# Patient Record
Sex: FEMALE | Race: WHITE | NOT HISPANIC OR LATINO | Employment: FULL TIME | URBAN - METROPOLITAN AREA
[De-identification: names, ages, dates, MRNs, and addresses within clinical notes are randomized per-mention and may not be internally consistent; named-entity substitution may affect disease eponyms.]

---

## 2018-06-22 ENCOUNTER — OFFICE VISIT (OUTPATIENT)
Dept: URGENT CARE | Facility: CLINIC | Age: 33
End: 2018-06-22
Payer: COMMERCIAL

## 2018-06-22 VITALS
RESPIRATION RATE: 12 BRPM | HEART RATE: 66 BPM | DIASTOLIC BLOOD PRESSURE: 78 MMHG | TEMPERATURE: 99.9 F | SYSTOLIC BLOOD PRESSURE: 120 MMHG | HEIGHT: 67 IN | WEIGHT: 133.6 LBS | OXYGEN SATURATION: 97 % | BODY MASS INDEX: 20.97 KG/M2

## 2018-06-22 DIAGNOSIS — J06.9 UPPER RESPIRATORY TRACT INFECTION, UNSPECIFIED TYPE: ICD-10-CM

## 2018-06-22 PROCEDURE — 99204 OFFICE O/P NEW MOD 45 MIN: CPT | Performed by: PHYSICIAN ASSISTANT

## 2018-06-22 RX ORDER — BENZONATATE 200 MG/1
200 CAPSULE ORAL 3 TIMES DAILY PRN
Qty: 30 CAP | Refills: 0 | Status: SHIPPED | OUTPATIENT
Start: 2018-06-22 | End: 2019-05-31

## 2018-06-22 RX ORDER — CODEINE PHOSPHATE AND GUAIFENESIN 10; 100 MG/5ML; MG/5ML
5 SOLUTION ORAL EVERY 6 HOURS PRN
Qty: 90 ML | Refills: 0 | Status: SHIPPED | OUTPATIENT
Start: 2018-06-22 | End: 2018-06-29

## 2018-06-22 ASSESSMENT — ENCOUNTER SYMPTOMS
MYALGIAS: 1
TINGLING: 0
SORE THROAT: 0
SENSORY CHANGE: 0
CHILLS: 0
VOMITING: 0
ABDOMINAL PAIN: 0
COUGH: 1
DIARRHEA: 0
SHORTNESS OF BREATH: 0
NAUSEA: 0
SPUTUM PRODUCTION: 0
PALPITATIONS: 0
HEADACHES: 0
FOCAL WEAKNESS: 0
RHINORRHEA: 0
SINUS PAIN: 0
HEMOPTYSIS: 0
WHEEZING: 0
FEVER: 0

## 2018-06-22 ASSESSMENT — PATIENT HEALTH QUESTIONNAIRE - PHQ9: CLINICAL INTERPRETATION OF PHQ2 SCORE: 0

## 2018-06-22 ASSESSMENT — COPD QUESTIONNAIRES: COPD: 0

## 2018-06-22 NOTE — PROGRESS NOTES
Subjective:      Priscilla Salvador is a 33 y.o. female who presents with Cough (has been fighting a cold, cough worsens at night, R side soreness under armpit )            Cough   This is a new problem. Episode onset: 1 week  The problem has been gradually improving. The cough is non-productive. Associated symptoms include chest pain (right rib area ) and myalgias. Pertinent negatives include no chills, ear congestion, ear pain, fever, headaches, hemoptysis, nasal congestion, postnasal drip, rash, rhinorrhea, sore throat, shortness of breath or wheezing. The symptoms are aggravated by lying down. She has tried OTC cough suppressant for the symptoms. The treatment provided no relief. There is no history of asthma, bronchitis, COPD or pneumonia.     No past medical history on file.    Past Surgical History:   Procedure Laterality Date   • BREAST IMPLANT REVISION Bilateral 1/7/2016    Procedure: BREAST IMPLANT - EXCHANGE;  Surgeon: Bg Hopkins Jr., M.D.;  Location: [facility];  Service:    • BREAST IMPLANT REVISION  3/8/2012    Performed by BG HOPKINS JR at [facility]       Family History   Problem Relation Age of Onset   • Family history unknown: Yes       No Known Allergies    Medications, Allergies, and current problem list reviewed today in Epic      Review of Systems   Constitutional: Negative for chills, fever and malaise/fatigue.   HENT: Negative for congestion, ear pain, postnasal drip, rhinorrhea, sinus pain and sore throat.    Respiratory: Positive for cough. Negative for hemoptysis, sputum production, shortness of breath and wheezing.    Cardiovascular: Positive for chest pain (right rib area ). Negative for palpitations and leg swelling.   Gastrointestinal: Negative for abdominal pain, diarrhea, nausea and vomiting.   Musculoskeletal: Positive for myalgias.   Skin: Negative for rash.   Neurological: Negative for tingling, sensory change, focal weakness and  "headaches.     All other systems reviewed and are negative.        Objective:     /78   Pulse 66   Temp 37.7 °C (99.9 °F)   Resp 12   Ht 1.689 m (5' 6.5\")   Wt 60.6 kg (133 lb 9.6 oz)   SpO2 97%   Breastfeeding? No   BMI 21.24 kg/m²      Physical Exam   Constitutional: She is oriented to person, place, and time. She appears well-developed and well-nourished. No distress.   HENT:   Head: Normocephalic and atraumatic.   Right Ear: Tympanic membrane, external ear and ear canal normal.   Left Ear: Tympanic membrane, external ear and ear canal normal.   Nose: Nose normal.   Mouth/Throat: Uvula is midline, oropharynx is clear and moist and mucous membranes are normal.   Cardiovascular: Normal rate and regular rhythm.  Exam reveals no gallop and no friction rub.    No murmur heard.  Pulmonary/Chest: Effort normal and breath sounds normal. No respiratory distress. She has no decreased breath sounds. She has no wheezes. She has no rhonchi. She has no rales.       Neurological: She is alert and oriented to person, place, and time. No cranial nerve deficit.   Psychiatric: She has a normal mood and affect. Her behavior is normal. Judgment and thought content normal.               Assessment/Plan:     1. Upper respiratory tract infection, unspecified type  guaifenesin-codeine (CHERATUSSIN AC) Solution oral solution    benzonatate (TESSALON) 200 MG capsule         Current Outpatient Prescriptions:   •  guaifenesin-codeine (CHERATUSSIN AC) Solution oral solution, Take 5 mL by mouth every 6 hours as needed for up to 7 days., Disp: 90 mL, Rfl: 0  Sedation side effects discussed. No Driving or alcohol with medication given.    [facility] Aware web site evaluation: I have obtained and reviewed patient utilization report from [facility] pharmacy database prior to writing prescription for controlled substance II, III or IV per [place] bill . Based on the report and my clinical assessment the prescription is medically " necessary.    •  benzonatate (TESSALON) 200 MG capsule, Take 1 Cap by mouth 3 times a day as needed for Cough., Disp: 30 Cap, Rfl: 0       Differential diagnoses, Supportive care, and indications for immediate follow-up discussed with patient.   Instructed to return to clinic or nearest emergency department for any change in condition, further concerns, or worsening of symptoms.    The patient demonstrated a good understanding and agreed with the treatment plan.    Annel Eastman P.A.-C.

## 2019-04-15 ENCOUNTER — TELEPHONE (OUTPATIENT)
Dept: SCHEDULING | Facility: IMAGING CENTER | Age: 34
End: 2019-04-15

## 2019-05-22 ENCOUNTER — PATIENT MESSAGE (OUTPATIENT)
Dept: INTERNAL MEDICINE | Facility: MEDICAL CENTER | Age: 34
End: 2019-05-22

## 2019-05-23 ENCOUNTER — TELEPHONE (OUTPATIENT)
Dept: SCHEDULING | Facility: IMAGING CENTER | Age: 34
End: 2019-05-23

## 2019-05-31 ENCOUNTER — OFFICE VISIT (OUTPATIENT)
Dept: MEDICAL GROUP | Facility: MEDICAL CENTER | Age: 34
End: 2019-05-31
Payer: COMMERCIAL

## 2019-05-31 VITALS
WEIGHT: 134.92 LBS | HEIGHT: 67 IN | DIASTOLIC BLOOD PRESSURE: 72 MMHG | HEART RATE: 57 BPM | BODY MASS INDEX: 21.18 KG/M2 | TEMPERATURE: 98.6 F | SYSTOLIC BLOOD PRESSURE: 98 MMHG | OXYGEN SATURATION: 100 %

## 2019-05-31 DIAGNOSIS — Z00.00 HEALTH CARE MAINTENANCE: ICD-10-CM

## 2019-05-31 DIAGNOSIS — M25.561 CHRONIC PAIN OF RIGHT KNEE: ICD-10-CM

## 2019-05-31 DIAGNOSIS — Z30.41 ORAL CONTRACEPTIVE USE: ICD-10-CM

## 2019-05-31 DIAGNOSIS — G89.29 CHRONIC PAIN OF RIGHT KNEE: ICD-10-CM

## 2019-05-31 DIAGNOSIS — Z76.89 ENCOUNTER TO ESTABLISH CARE: ICD-10-CM
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
## 2019-05-31 PROCEDURE — 99214 OFFICE O/P EST MOD 30 MIN: CPT | Performed by: INTERNAL MEDICINE

## 2019-05-31 ASSESSMENT — PATIENT HEALTH QUESTIONNAIRE - PHQ9: CLINICAL INTERPRETATION OF PHQ2 SCORE: 0

## 2019-05-31 NOTE — PROGRESS NOTES
CHIEF COMPLAINT  Chief Complaint   Patient presents with   • Establish Care   • Knee Pain     (R) Knee injury x 10 years ago / Popping knee       HPI  Patient is a 34 y.o. female patient who presents today for the following     Right knee pain, overuse injury  - Onset: ~ 10 yrs  - Triger: lifting, running  - located in: RT knee  - intensity: mild  - quality:  ache  - radiation:  no  - alleviating factors are:  rest  -  exacerbating factors are:  activity  - accompanied:    - popping, crackling   - no numbness, weakness, tingling, fever, chills  - course: up/down  - imaging: pending  - treatment: as above    OCP  Started 4 yrs ago  On: Norethindrone Acet-Ethinyl Est (Microgestin) 1/20 PO  No:   Uncontrolled migraine  Hypertension  Past medical history or family history of VTE  Smoking/tobacco use.    Reviewed PMH, PSH, FH, SH, ALL, HCM/IMM  Reviewed MEDS    Patient Active Problem List    Diagnosis Date Noted   • Oral contraceptive use 05/31/2019   • Health care maintenance 05/31/2019   • Chronic pain of right knee 05/31/2019     CURRENT MEDICATIONS  Current Outpatient Prescriptions   Medication Sig Dispense Refill   • Omega-3 Fatty Acids (FISH OIL) 1000 MG Cap capsule Take 1,000 mg by mouth 3 times a day, with meals.     • multivitamin (THERAGRAN) Tab Take 1 Tab by mouth every day.     • vitamin D (CHOLECALCIFEROL) 1000 UNIT Tab Take 1,000 Units by mouth every day.     • ascorbic acid (ASCORBIC ACID) 500 MG Tab Take 500 mg by mouth every day.     • Biotin 10 MG Tab Take 1 Tab by mouth every day.       No current facility-administered medications for this visit.      ALLERGIES  Allergies: Patient has no known allergies.  PAST MEDICAL HISTORY  No past medical history on file.  SURGICAL HISTORY  She  has a past surgical history that includes breast implant revision (3/8/2012) and breast implant revision (Bilateral, 1/7/2016).  SOCIAL HISTORY  Social History   Substance Use Topics   • Smoking status: Never Smoker   •  "Smokeless tobacco: Never Used   • Alcohol use No     Social History     Social History Narrative   • No narrative on file     FAMILY HISTORY  Family History   Problem Relation Age of Onset   • Hypertension Mother    • Hyperlipidemia Father    • No Known Problems Sister    • No Known Problems Brother      Family Status   Relation Status   • Mo    • Fa    • Sis Alive   • Bro Alive     ROS   Constitutional: Negative for fever, chills.  HENT: Negative for congestion, sore throat.  Eyes: Negative for blurred vision.   Respiratory: Negative for cough, shortness of breath.  Cardiovascular: Negative for chest pain, palpitations.   Gastrointestinal: Negative for heartburn, nausea, abdominal pain.   Genitourinary: As above.  Musculoskeletal: as above.  Skin: Negative for rash and itching.   Neuro: Negative for dizziness, weakness and headaches.   Endo/Heme/Allergies: Does not bruise/bleed easily.   Psychiatric/Behavioral: Negative for depression.    PHYSICAL EXAM   BP (!) 98/72 (BP Location: Right arm, Patient Position: Sitting, BP Cuff Size: Adult)   Pulse (!) 57   Temp 37 °C (98.6 °F) (Temporal)   Ht 1.689 m (5' 6.5\")   Wt 61.2 kg (134 lb 14.7 oz)   SpO2 100%  Body mass index is 21.45 kg/m².  General:  NAD, well appearing  HEENT:   NC/AT, PERRLA, EOMI, TMs are clear. Oropharyngeal mucosa is pink,  without lesions;  no cervical / supraclavicular  lymphadenopathy, no thyromegaly.    Cardiovascular: RRR.   No m/r/g. No carotid bruits .      Lungs:   CTAB, no w/r/r, no respiratory distress.  Abdomen: Soft, NT/ND + BS; no suprapubic tenderness; no masses or hepatosplenomegaly.  Extremities:  2+ DP and radial pulses bilaterally.  No c/c/e.   Skin:  Warm, dry.  No erythema. No rash.   Neurologic: Alert & oriented x 3. CN II-XII grossly intact. Brachioradialis / knee DTR are 2/4, symmetric. Strength and sensation grossly intact.  No focal deficits.  Psychiatric:  Affect normal, mood normal, judgment " normal.  MS: no effusion, redness, swelling of the RT knee.    LABS     None    IMAGING     None    ASSESMENT AND PLAN        1. Chronic pain of right knee  Advised to decrease physical activity/running.  Knee brace may help.  Referred to sports medicine, as she is involved in sports/exercise.  - DX-KNEE 2- RIGHT; Future  - REFERRAL TO SPORTS MEDICINE    2. Overuse injury  As above  - DX-KNEE 2- RIGHT; Future  - REFERRAL TO SPORTS MEDICINE    3. Oral contraceptive use  No adverse effects, continue current treatment and GYN follow-up    4. Health care maintenance  5. Encounter to establish care  Reviewed PMH, PSH, FH, SH, ALL, MEDS, HCM/IMM.   Release form for old records: signed    Counseling:   - Smoking:  Nonsmoker    Followup: Return in about 6 months (around 11/30/2019), or if symptoms worsen or fail to improve.    All questions are answered.    Please note that this dictation was created using voice recognition software, and that there might be errors of mary and possibly content.

## 2019-09-03 ENCOUNTER — OFFICE VISIT (OUTPATIENT)
Dept: MEDICAL GROUP | Facility: MEDICAL CENTER | Age: 34
End: 2019-09-03
Payer: COMMERCIAL

## 2019-09-03 ENCOUNTER — HOSPITAL ENCOUNTER (OUTPATIENT)
Facility: MEDICAL CENTER | Age: 34
End: 2019-09-03
Attending: INTERNAL MEDICINE
Payer: COMMERCIAL

## 2019-09-03 VITALS
SYSTOLIC BLOOD PRESSURE: 96 MMHG | BODY MASS INDEX: 21 KG/M2 | TEMPERATURE: 97.3 F | HEIGHT: 67 IN | WEIGHT: 133.82 LBS | OXYGEN SATURATION: 99 % | HEART RATE: 58 BPM | DIASTOLIC BLOOD PRESSURE: 70 MMHG

## 2019-09-03 DIAGNOSIS — M25.561 CHRONIC PAIN OF RIGHT KNEE: ICD-10-CM

## 2019-09-03 DIAGNOSIS — Z00.00 HEALTH CARE MAINTENANCE: ICD-10-CM

## 2019-09-03 DIAGNOSIS — Z01.419 WELL FEMALE EXAM WITH ROUTINE GYNECOLOGICAL EXAM: ICD-10-CM

## 2019-09-03 DIAGNOSIS — Z12.4 SCREENING FOR MALIGNANT NEOPLASM OF CERVIX: ICD-10-CM

## 2019-09-03 DIAGNOSIS — G89.29 CHRONIC PAIN OF RIGHT KNEE: ICD-10-CM

## 2019-09-03 DIAGNOSIS — Z23 NEED FOR TDAP VACCINATION: ICD-10-CM

## 2019-09-03 DIAGNOSIS — Z30.41 ORAL CONTRACEPTIVE USE: ICD-10-CM

## 2019-09-03 PROCEDURE — 87624 HPV HI-RISK TYP POOLED RSLT: CPT

## 2019-09-03 PROCEDURE — 88175 CYTOPATH C/V AUTO FLUID REDO: CPT

## 2019-09-03 PROCEDURE — 99395 PREV VISIT EST AGE 18-39: CPT | Performed by: INTERNAL MEDICINE

## 2019-09-03 NOTE — PROGRESS NOTES
CHIEF COMPLIANT:  Priscilla Salvador is a 34 y.o. female who presents for annual exam    Recommendations:  Regular exercise at least 4 days a week  Diet: advised balanced diet  Dental exam at least 1-2 times per year  Sunscreen use: advised    Immunizations:  TdaP:  9/3/2019  Influenza: unavailable    GYN  Previous PAP:  normal   Abnormal PAP: no    Menses every month with bleeding for 4 days  No excess cramping or bleeding.   Takes OTC analgesics for cramping  Contraception: Norethindrone Acet-Ethinyl Est (Microgestin)  PO    CURRENT MEDICATIONS  Current Outpatient Medications   Medication Sig Dispense Refill   • Omega-3 Fatty Acids (FISH OIL) 1000 MG Cap capsule Take 1,000 mg by mouth 3 times a day, with meals.     • multivitamin (THERAGRAN) Tab Take 1 Tab by mouth every day.     • Biotin 10 MG Tab Take 1 Tab by mouth every day.     • vitamin D (CHOLECALCIFEROL) 1000 UNIT Tab Take 1,000 Units by mouth every day.     • ascorbic acid (ASCORBIC ACID) 500 MG Tab Take 500 mg by mouth every day.       No current facility-administered medications for this visit.      ALLERGIES  Allergies: Patient has no known allergies.  PAST MEDICAL HISTORY  She  has no past medical history on file.  SURGICAL HISTORY  She  has a past surgical history that includes breast implant revision (3/8/2012) and breast implant revision (Bilateral, 2016).  SOCIAL HISTORY  Social History     Tobacco Use   • Smoking status: Never Smoker   • Smokeless tobacco: Never Used   Substance Use Topics   • Alcohol use: No   • Drug use: No     Social History     Social History Narrative   • Not on file     FAMILY HISTORY  Family History   Problem Relation Age of Onset   • Hypertension Mother    • Hyperlipidemia Father    • No Known Problems Sister    • No Known Problems Brother      Family Status   Relation Name Status   • Mo     • Fa     • Sis  Alive   • Bro  Alive     REVIEW OF SYSTEMS  Constitutional: Denies fever, chills,  "fatigue.  Skin: negative for rash, scaling, itching.  Eyes: negative for blurred vision, discharge from eyes.  ENT: negative for hearing problems.  Respiratory: negative for cough, SOB.  Cardiovascular: negative for palpitations, tachycardia, irregular heart beats, chest pain, or peripheral edema.  Gastrointestinal: negative for anorexia, dysphagia, nausea, heartburn/reflux, abdominal pain, hemorrhoids, constipation or diarrhea.  Genitourinary: negative for dysuria,  abnormal vaginal discharge/bleeding.  Musculoskeletal: negative for back/joint pain, myalgia.   Neuro: negative for migraine headaches, involuntary movements or tremor  Psychiatric: negative for excessive alcohol use, illegal drug use, sleep problems, anxiety, depression.  Hematologic/Lymphatic/Immunologic: negative for anemia, unusual bruising, swollen glands.  Endocrine: negative for temperature intolerance, polydipsia, polyuria.     PHYSICAL EXAMINATION:  Blood Pressure (Abnormal) 96/70   Pulse (Abnormal) 58   Temperature 36.3 °C (97.3 °F) (Temporal)   Height 1.689 m (5' 6.5\")   Weight 60.7 kg (133 lb 13.1 oz)   Oxygen Saturation 99%   Body mass index is 21.28 kg/m².  Wt Readings from Last 4 Encounters:   09/03/19 60.7 kg (133 lb 13.1 oz)   05/31/19 61.2 kg (134 lb 14.7 oz)   06/22/18 60.6 kg (133 lb 9.6 oz)   01/07/16 61 kg (134 lb 7.7 oz)     General appearance:healthy, well developed, well nourished, well-groomed  Psych: alert, no distress, cooperative. Eye contact good, speech goal directed. Affect calm.   Eyes: conjunctivae and sclerae normal, lids and lashes normal, EOMI, PERRLA  ENT: Ears: external ears normal to inspection, canals clear. TMs pearly gray with normal light reflex and anatomic landmarks, Nose/Sinuses: nose shows no deformity, asymmetry, or inflammation, nasal mucosa normal, no sinus tenderness,   Oropharynx: lips normal without lesions, buccal mucosa normal, gums healthy, teeth intact, non-carious, palate normal, tongue " midline and normal  Neck: no asymmetry, masses, adenopathy. Thyroid normal to palpation, carotids without bruits, no jugular venous distention  Lungs: clear to auscultation with good excursion, Normal respiratory rate. Chest symmetric no chest wall tenderness.  Cardiovascular: Regular rate and rhythm, no murmur.  No peripheral edema  Abdomen:  Soft, non-tender, no masses, HSM or palpable renal abnormalities. Bowel sounds normal, no bruits heard. No CVAT.  Musculoskeletal: no evidence of joint effusion, ROM of all joints is normal, no crepitation detected, strength grossly normal UE and LE, proximal and distal motor groups. No deformities present  Lymphatic: None significantly enlarged supraclavicular, axillary, or inguinal  Skin: color normal, vascularity normal, no rashes or suspicious lesions, no evidence of bleeding or bruising  Neuro: speech normal, mental status intact, gait grossly normal, muscle tone normal.  GYN: No suprapubic tenderness.  Perineum and external genitalia normal without rash.   Vagina with without discharge, normal mucosa.  Cervix w/o visible lesions or discharge. No CMT  Uterus normal size, non-tender, no masses,   Adnexa w/o mass or tenderness.   PAP smear was obtained.    LABS    None    ASSESSMENT/PLAN:    1. Well female exam with routine gynecological exam  - THINPREP PAP WITH HPV; Future  2. Screening for malignant neoplasm of cervix  - THINPREP PAP WITH HPV; Future    3. Health care maintenance  - THINPREP PAP WITH HPV; Future    4. Oral contraceptive use  Continue current treatment    5. Chronic pain of right knee  Currently stable    6. Need for Tdap vaccination  Unavailable in the office today    Smoking Counseling: Nonsmoker    Next office visit is due in  1 year and prn    All questions are answered.     Please note that this dictation was created using voice recognition software. Despite all efforts, some minor grammar mistakes are possible.

## 2019-09-04 LAB
CYTOLOGY REG CYTOL: NORMAL
HPV HR 12 DNA CVX QL NAA+PROBE: NEGATIVE
HPV16 DNA SPEC QL NAA+PROBE: NEGATIVE
HPV18 DNA SPEC QL NAA+PROBE: NEGATIVE
SPECIMEN SOURCE: NORMAL

## 2019-09-12 ENCOUNTER — OFFICE VISIT (OUTPATIENT)
Dept: MEDICAL GROUP | Facility: MEDICAL CENTER | Age: 34
End: 2019-09-12
Payer: COMMERCIAL

## 2019-09-12 VITALS
HEART RATE: 59 BPM | WEIGHT: 132.5 LBS | TEMPERATURE: 98.7 F | HEIGHT: 67 IN | BODY MASS INDEX: 20.8 KG/M2 | OXYGEN SATURATION: 98 % | DIASTOLIC BLOOD PRESSURE: 56 MMHG | SYSTOLIC BLOOD PRESSURE: 90 MMHG
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
## 2019-09-12 DIAGNOSIS — Z30.41 ORAL CONTRACEPTIVE USE: ICD-10-CM

## 2019-09-12 DIAGNOSIS — Z30.09 FAMILY PLANNING: ICD-10-CM

## 2019-09-12 DIAGNOSIS — G89.29 CHRONIC PAIN OF RIGHT KNEE: ICD-10-CM

## 2019-09-12 DIAGNOSIS — M25.561 CHRONIC PAIN OF RIGHT KNEE: ICD-10-CM

## 2019-09-12 DIAGNOSIS — Z00.00 HEALTH CARE MAINTENANCE: ICD-10-CM

## 2019-09-12 DIAGNOSIS — Z23 NEED FOR TDAP VACCINATION: ICD-10-CM

## 2019-09-12 PROCEDURE — 99213 OFFICE O/P EST LOW 20 MIN: CPT | Mod: 25 | Performed by: INTERNAL MEDICINE

## 2019-09-12 PROCEDURE — 90471 IMMUNIZATION ADMIN: CPT | Performed by: INTERNAL MEDICINE

## 2019-09-12 PROCEDURE — 90715 TDAP VACCINE 7 YRS/> IM: CPT | Performed by: INTERNAL MEDICINE

## 2019-09-12 RX ORDER — NORETHINDRONE ACETATE AND ETHINYL ESTRADIOL 1.5-30(21)
1 KIT ORAL DAILY
Qty: 84 TAB | Refills: 1 | Status: SHIPPED | OUTPATIENT
Start: 2019-09-12 | End: 2020-02-14 | Stop reason: SDUPTHER

## 2019-09-12 NOTE — PROGRESS NOTES
CHIEF COMPLAINT  Chief Complaint   Patient presents with   • Other     birth control, knee pain   • Immunizations     tdap needed?     HPI  Patient is a 34 y.o. female patient who presents today for the following     Family-planning OCP  The patient would like to get pregnant in a few months, as she is getting   -She is interested when to stop OCP, came for refill    Started 4 yrs ago  On: Norethindrone Acet-Ethinyl Est (Microgestin) 1/20 PO  No:   Uncontrolled migraine  Hypertension  Past medical history or family history of VTE  Smoking/tobacco use.    Right knee pain, overuse injury  - seen by sports medicine  - decreased activity  - improved    - Onset: ~ 10 yrs  - Triger: lifting, running  - located in: RT knee  - intensity: mild  - quality: ache  - radiation: no  - alleviating factors are: rest  - exacerbating factors are: activity  - accompanied:               - popping, crackling              - no numbness, weakness, tingling, fever, chills  - course: up/down  - imaging: pending  - treatment: as above           Reviewed PMH, PSH, FH, SH, ALL, HCM/IMM, no changes  Reviewed MEDS, no changes    Patient Active Problem List    Diagnosis Date Noted   • Oral contraceptive use 05/31/2019   • Health care maintenance 05/31/2019   • Chronic pain of right knee 05/31/2019     CURRENT MEDICATIONS  Current Outpatient Medications   Medication Sig Dispense Refill   • norethindrone-ethinyl estradiol-iron (MICROGESTIN FE1.5/30) 1.5-30 MG-MCG tablet Take 1 Tab by mouth every day. 84 Tab 1   • Omega-3 Fatty Acids (FISH OIL) 1000 MG Cap capsule Take 1,000 mg by mouth 3 times a day, with meals.     • multivitamin (THERAGRAN) Tab Take 1 Tab by mouth every day.     • Biotin 10 MG Tab Take 1 Tab by mouth every day.     • vitamin D (CHOLECALCIFEROL) 1000 UNIT Tab Take 1,000 Units by mouth every day.     • ascorbic acid (ASCORBIC ACID) 500 MG Tab Take 500 mg by mouth every day.       No current facility-administered medications  "for this visit.      ALLERGIES  Allergies: Patient has no known allergies.  PAST MEDICAL HISTORY  No past medical history on file.  SURGICAL HISTORY  She  has a past surgical history that includes breast implant revision (3/8/2012) and breast implant revision (Bilateral, 2016).  SOCIAL HISTORY  Social History     Tobacco Use   • Smoking status: Never Smoker   • Smokeless tobacco: Never Used   Substance Use Topics   • Alcohol use: No   • Drug use: No     Social History     Social History Narrative   • Not on file     FAMILY HISTORY  Family History   Problem Relation Age of Onset   • Hypertension Mother    • Hyperlipidemia Father    • No Known Problems Sister    • No Known Problems Brother      Family Status   Relation Name Status   • Mo     • Fa     • Sis  Alive   • Bro  Alive     ROS   Constitutional: Negative for fever, chills.  HENT: Negative for congestion, sore throat.  Eyes: Negative for blurred vision.   Respiratory: Negative for cough, shortness of breath.  Cardiovascular: Negative for chest pain, palpitations.   Gastrointestinal: Negative for heartburn, nausea, abdominal pain.   Genitourinary: Negative for dysuria.  Musculoskeletal: as above.  Skin: Negative for rash and itching.   Neuro: Negative for dizziness, weakness and headaches.   Endo/Heme/Allergies: Does not bruise/bleed easily.   Psychiatric/Behavioral: Negative for depression, anxiety    PHYSICAL EXAM   Blood Pressure (Abnormal) 90/56   Pulse (Abnormal) 59   Temperature 37.1 °C (98.7 °F) (Temporal)   Height 1.689 m (5' 6.5\")   Weight 60.1 kg (132 lb 7.9 oz)   Oxygen Saturation 98%  Body mass index is 21.07 kg/m².  General:  NAD, well appearing  HEENT:   NC/AT, PERRLA, EOMI, TMs are clear. Oropharyngeal mucosa is pink,  without lesions;  no cervical / supraclavicular  lymphadenopathy, no thyromegaly.    Cardiovascular: RRR.   No m/r/g. No carotid bruits .      Lungs:   CTAB, no w/r/r, no respiratory distress.  Abdomen: " Soft, NT/ND + BS; no suprapubic tenderness; no masses or hepatosplenomegaly.  Extremities:  2+ DP and radial pulses bilaterally.  No c/c/e.   Skin:  Warm, dry.  No erythema. No rash.   Neurologic: Alert & oriented x 3. CN II-XII grossly intact. Brachioradialis / knee DTR are 2/4, symmetric. Strength and sensation grossly intact.  No focal deficits.  Psychiatric:  Affect normal, mood normal, judgment normal.    LABS     Labs are reviewed and discussed with a patient    PAP/HPV negative on 9/3/19    IMAGING     None    ASSESMENT AND PLAN        1. Family planning  Advised to stop OCP at least 2 months before she wants to get pregnant and start prenatal multivitamins at that time  - norethindrone-ethinyl estradiol-iron (MICROGESTIN FE1.5/30) 1.5-30 MG-MCG tablet; Take 1 Tab by mouth every day.  Dispense: 84 Tab; Refill: 1  2. Oral contraceptive use  - norethindrone-ethinyl estradiol-iron (MICROGESTIN FE1.5/30) 1.5-30 MG-MCG tablet; Take 1 Tab by mouth every day.  Dispense: 84 Tab; Refill: 1    3. Chronic pain of right knee  4. Overuse injury  Improved, continue activity as tolerated    5. Need for Tdap vaccination  - Tdap =>8yo IM  Information was provided to the patient regarding the vaccine, including side effects. Vaccine was given by my medical assistant under my supervision.    6. Health care maintenance  Due flu shot unavailable    Counseling:   - Smoking:  Nonsmoker    Followup: Return in about 1 year (around 9/12/2020), or if symptoms worsen or fail to improve.    All questions are answered.    Please note that this dictation was created using voice recognition software, and that there might be errors of mary and possibly content.

## 2019-10-20 DIAGNOSIS — Z31.69 INFERTILITY COUNSELING: ICD-10-CM

## 2020-02-14 DIAGNOSIS — Z30.41 ORAL CONTRACEPTIVE USE: ICD-10-CM

## 2020-02-14 DIAGNOSIS — Z30.09 FAMILY PLANNING: ICD-10-CM

## 2020-02-14 RX ORDER — NORETHINDRONE ACETATE AND ETHINYL ESTRADIOL 1.5-30(21)
1 KIT ORAL DAILY
Qty: 84 TAB | Refills: 1 | Status: SHIPPED | OUTPATIENT
Start: 2020-02-14 | End: 2020-06-22

## 2020-06-21 DIAGNOSIS — Z30.41 ORAL CONTRACEPTIVE USE: ICD-10-CM

## 2020-06-21 DIAGNOSIS — Z30.09 FAMILY PLANNING: ICD-10-CM

## 2020-06-22 RX ORDER — NORETHINDRONE ACETATE AND ETHINYL ESTRADIOL AND FERROUS FUMARATE 1.5-30(21)
KIT ORAL
Qty: 84 TAB | Refills: 0 | Status: SHIPPED | OUTPATIENT
Start: 2020-06-22 | End: 2020-10-26 | Stop reason: SDUPTHER

## 2020-09-08 DIAGNOSIS — Z30.09 FAMILY PLANNING: ICD-10-CM

## 2020-09-08 DIAGNOSIS — Z30.41 ORAL CONTRACEPTIVE USE: ICD-10-CM

## 2020-09-09 RX ORDER — NORETHINDRONE ACETATE AND ETHINYL ESTRADIOL AND FERROUS FUMARATE 1.5-30(21)
KIT ORAL
Qty: 84 TAB | Refills: 3 | OUTPATIENT
Start: 2020-09-09

## 2020-09-10 NOTE — TELEPHONE ENCOUNTER
Phone Number Called: [phone] (home)       Call outcome: VM not set up    Message: Unable to leave vm pt vm is not set up will try again later.

## 2020-09-15 NOTE — TELEPHONE ENCOUNTER
There is a mychart encounter, where patient states that she is does not wish to fill this medication at this time.

## 2020-09-15 NOTE — TELEPHONE ENCOUNTER
Phone Number Called: [phone]    Call outcome: VM not set up    Message: Could not leave message for patient. No voicemail set up. Will send [employer] message.

## 2020-10-26 ENCOUNTER — OFFICE VISIT (OUTPATIENT)
Dept: MEDICAL GROUP | Age: 35
End: 2020-10-26
Payer: COMMERCIAL

## 2020-10-26 VITALS
HEIGHT: 66 IN | DIASTOLIC BLOOD PRESSURE: 64 MMHG | HEART RATE: 60 BPM | WEIGHT: 130.8 LBS | SYSTOLIC BLOOD PRESSURE: 92 MMHG | TEMPERATURE: 98.6 F | OXYGEN SATURATION: 98 % | BODY MASS INDEX: 21.02 KG/M2

## 2020-10-26 DIAGNOSIS — Z00.00 ANNUAL PHYSICAL EXAM: ICD-10-CM

## 2020-10-26 DIAGNOSIS — Z00.00 HEALTH CARE MAINTENANCE: ICD-10-CM

## 2020-10-26 PROCEDURE — 99395 PREV VISIT EST AGE 18-39: CPT | Performed by: INTERNAL MEDICINE

## 2020-10-26 RX ORDER — NORETHINDRONE ACETATE AND ETHINYL ESTRADIOL 1.5-30(21)
1 KIT ORAL
Qty: 84 TAB | Refills: 3 | Status: SHIPPED | OUTPATIENT
Start: 2020-10-26 | End: 2020-10-26

## 2020-10-26 ASSESSMENT — ANXIETY QUESTIONNAIRES
7. FEELING AFRAID AS IF SOMETHING AWFUL MIGHT HAPPEN: NOT AT ALL
2. NOT BEING ABLE TO STOP OR CONTROL WORRYING: NOT AT ALL
5. BEING SO RESTLESS THAT IT IS HARD TO SIT STILL: SEVERAL DAYS
3. WORRYING TOO MUCH ABOUT DIFFERENT THINGS: NOT AT ALL
4. TROUBLE RELAXING: NOT AT ALL
6. BECOMING EASILY ANNOYED OR IRRITABLE: NOT AT ALL
1. FEELING NERVOUS, ANXIOUS, OR ON EDGE: NOT AT ALL
GAD7 TOTAL SCORE: 1

## 2020-10-26 ASSESSMENT — PATIENT HEALTH QUESTIONNAIRE - PHQ9: CLINICAL INTERPRETATION OF PHQ2 SCORE: 0

## 2020-10-26 NOTE — PROGRESS NOTES
CHIEF COMPLAINT  Chief Complaint   Patient presents with   • Annual Exam     HPI  Priscilla Salvador is a 35 y.o. female who presents today for the following     Recommendations:  Regular exercise at least 4 days a week  Diet: advised balanced diet  Dental exam at least 1-2 times per year  Sunscreen use: advised     Immunizations:  TdaP:  9/3/2019  Influenza: unavailable     GYN  Previous PAP:  normal   Abnormal PAP: no     Menses every month with bleeding for 4 days  No excess cramping or bleeding.   Takes OTC analgesics for cramping  Contraception: none    Reviewed PMH, PSH, FH, SH, ALL, HCM/IMM, no changes  Reviewed MEDS, no changes    Patient Active Problem List    Diagnosis Date Noted   • Oral contraceptive use 05/31/2019   • Health care maintenance 05/31/2019   • Chronic pain of right knee 05/31/2019     CURRENT MEDICATIONS  Current Outpatient Medications   Medication Sig Dispense Refill   • norethindrone-ethinyl estradiol-iron (JUNEL FE 1.5/30) 1.5-30 MG-MCG tablet Take 1 Tab by mouth every day. 84 Tab 3   • Omega-3 Fatty Acids (FISH OIL) 1000 MG Cap capsule Take 1,000 mg by mouth 3 times a day, with meals.     • multivitamin (THERAGRAN) Tab Take 1 Tab by mouth every day.     • vitamin D (CHOLECALCIFEROL) 1000 UNIT Tab Take 1,000 Units by mouth every day.     • ascorbic acid (ASCORBIC ACID) 500 MG Tab Take 500 mg by mouth every day.     • Biotin 10 MG Tab Take 1 Tab by mouth every day.       No current facility-administered medications for this visit.      ALLERGIES  Allergies: Patient has no known allergies.  PAST MEDICAL HISTORY  No past medical history on file.  SURGICAL HISTORY  She  has a past surgical history that includes breast implant revision (3/8/2012) and breast implant revision (Bilateral, 1/7/2016).  SOCIAL HISTORY  Social History     Tobacco Use   • Smoking status: Never Smoker   • Smokeless tobacco: Never Used   Substance Use Topics   • Alcohol use: No   • Drug use: No     Social History  "    Social History Narrative   • Not on file     FAMILY HISTORY  Family History   Problem Relation Age of Onset   • Hypertension Mother    • Hyperlipidemia Father    • No Known Problems Sister    • No Known Problems Brother      Family Status   Relation Name Status   • Mo     • Fa     • Sis  Alive   • Bro  Alive       ROS   Constitutional: Negative for fever, chills, fatigue.  HENT: Negative for congestion, sore throat.  Eyes: Negative for vision problems.   Respiratory: Negative for cough, shortness of breath.  Cardiovascular: Negative for chest pain, palpitations.   Gastrointestinal: Negative for heartburn, nausea, abdominal pain.   Genitourinary: Negative for dysuria.  Musculoskeletal: Negative for significant myalgia, back and joint pain.   Skin: Negative for rash.   Neuro: Negative for dizziness, weakness and headaches.   Endo/Heme/Allergies: Does not bruise/bleed easily.   Psychiatric/Behavioral: Negative for depression.    PHYSICAL EXAM   Blood Pressure (Abnormal) 92/64 (BP Location: Left arm, Patient Position: Sitting, BP Cuff Size: Adult)   Pulse 60   Temperature 37 °C (98.6 °F) (Temporal)   Height 1.676 m (5' 6\")   Weight 59.3 kg (130 lb 12.8 oz)   Oxygen Saturation 98%  Body mass index is 21.11 kg/m².  General:  NAD, well appearing  HEENT:   NC/AT, PERRLA, EOMI.  Cardiovascular: unlabored breathing, no peripheral cyanosis or swelling.     Lungs:   no respiratory distress.  Abdomen: non- distended.  Extremities:  No LE swelling.  Skin:  Warm, dry.  No erythema. No rash.   Neurologic: Alert & oriented x 3. CN II-XII grossly intact. No focal deficits.  Psychiatric:  Affect normal, mood normal, judgment normal.    Labs     Labs are reviewed and discussed with a patient 9/3/2019    HPV/PAP negative     Imaging     None    Assessment and Plan     Priscilla Salvador is a 35 y.o. female    1. Annual physical exam  Reviewed PMH, PSH, FH, SH, ALL, MEDS, HCM/IMM.   Advised healthy habits, " diet, exercise.    2. Health care maintenance  Per HPI, UTD    Counseling:   - Smoking:  Nonsmoker    Followup: in 1 year and prn    All questions are answered.    Please note that this dictation was created using voice recognition software, and that there might be errors of mary and possibly content.

## 2021-01-12 ENCOUNTER — TELEPHONE (OUTPATIENT)
Dept: MEDICAL GROUP | Age: 36
End: 2021-01-12

## 2021-08-24 ENCOUNTER — TELEMEDICINE (OUTPATIENT)
Dept: MEDICAL GROUP | Age: 36
End: 2021-08-24
Payer: COMMERCIAL

## 2021-08-24 VITALS — HEIGHT: 66 IN | WEIGHT: 131 LBS | TEMPERATURE: 97.3 F | BODY MASS INDEX: 21.05 KG/M2

## 2021-08-24 DIAGNOSIS — M25.569 RECURRENT KNEE PAIN, UNSPECIFIED LATERALITY: ICD-10-CM

## 2021-08-24 DIAGNOSIS — Z71.85 IMMUNIZATION COUNSELING: ICD-10-CM

## 2021-08-24 DIAGNOSIS — Z30.09 FAMILY PLANNING: ICD-10-CM

## 2021-08-24 DIAGNOSIS — Z30.41 ORAL CONTRACEPTIVE USE: ICD-10-CM

## 2021-08-24 DIAGNOSIS — Z00.00 HEALTH CARE MAINTENANCE: ICD-10-CM

## 2021-08-24 PROCEDURE — 99213 OFFICE O/P EST LOW 20 MIN: CPT | Mod: 95 | Performed by: INTERNAL MEDICINE

## 2021-08-24 RX ORDER — NORETHINDRONE ACETATE AND ETHINYL ESTRADIOL 1.5-30(21)
1 KIT ORAL
Qty: 84 TABLET | Refills: 3 | Status: SHIPPED | OUTPATIENT
Start: 2021-08-24

## 2021-08-24 ASSESSMENT — PATIENT HEALTH QUESTIONNAIRE - PHQ9: CLINICAL INTERPRETATION OF PHQ2 SCORE: 0

## 2021-08-24 NOTE — PROGRESS NOTES
Telemedicine Visit: Established Patient     This Remote Face to Face encounter was conducted via Zoom. Given the importance of social distancing and other strategies recommended to reduce the risk of COVID-19 transmission, I am providing medical care to this patient via audio/video visit in place of an in person visit at the request of the patient. Verbal consent to telehealth, risks, benefits, and consequences were discussed. Patient retains the right to withdraw at any time. All existing confidentiality protections apply. The patient has access to all transmitted medical information. No dissemination of any patient images or information to other entities without further written consent.    CHIEF COMPLAINT     Chief Complaint   Patient presents with   • Contraception     HPI  Priscilla Salvador is a 36 y.o. female who presents today for the following     Family-planning OCP  Interval course:  -She stopped OCP in August 2020 (1 year ago), would like to restart     Started 4 yrs ago  On: Norethindrone Acet-Ethinyl Est (Microgestin) 1/20 PO  No:   Uncontrolled migraine  Hypertension  Past medical history or family history of VTE  Smoking/tobacco use.     Right knee pain, (overuse injury)  - generally improved and stable, but still present  - evaluated by sports medicine     - Onset: ~ 10 yrs  - Triger: lifting, running  - located in: RT knee  - intensity: mild  - quality: ache  - radiation: no  - alleviating factors are: rest  - exacerbating factors are: activity  - accompanied:               - popping, crackling              - no numbness, weakness, tingling, fever, chills  - course: up/down  - imaging: pending  - treatment: as above    HCM  Immunizations:  Due covid vaccine    Reviewed PMH, PSH, FH, SH, ALL, HCM/IMM, no changes  Reviewed MEDS, no changes    Patient Active Problem List    Diagnosis Date Noted   • Oral contraceptive use 05/31/2019   • Health care maintenance 05/31/2019   • Recurrent knee pain  2019     CURRENT MEDICATIONS  Current Outpatient Medications   Medication Sig Dispense Refill   • norethindrone-ethinyl estradiol-iron (JUNEL FE ) 1.5-30 MG-MCG tablet Take 1 Tablet by mouth every day. 84 Tablet 3   • Omega-3 Fatty Acids (FISH OIL) 1000 MG Cap capsule Take 1,000 mg by mouth 3 times a day, with meals.     • multivitamin (THERAGRAN) Tab Take 1 Tab by mouth every day.     • vitamin D (CHOLECALCIFEROL) 1000 UNIT Tab Take 1,000 Units by mouth every day.     • ascorbic acid (ASCORBIC ACID) 500 MG Tab Take 500 mg by mouth every day.     • Biotin 10 MG Tab Take 1 Tab by mouth every day.       No current facility-administered medications for this visit.     ALLERGIES  Allergies: Patient has no known allergies.  PAST MEDICAL HISTORY  No past medical history on file.  SURGICAL HISTORY  She  has a past surgical history that includes breast implant revision (3/8/2012) and breast implant revision (Bilateral, 2016).  SOCIAL HISTORY  Social History     Tobacco Use   • Smoking status: Never Smoker   • Smokeless tobacco: Never Used   Vaping Use   • Vaping Use: Never used   Substance Use Topics   • Alcohol use: Yes     Comment: On occ   • Drug use: No     Social History     Social History Narrative   • Not on file     FAMILY HISTORY  Family History   Problem Relation Age of Onset   • Hypertension Mother    • Hyperlipidemia Father    • No Known Problems Sister    • No Known Problems Brother      Family Status   Relation Name Status   • Mo     • Fa     • Sis  Alive   • Bro  Alive     ROS   Constitutional: Negative for fever, chills.  Eyes: Negative for vision problems.   Respiratory: Negative for cough, shortness of breath.  Cardiovascular: Negative for chest pain, palpitations.   Gastrointestinal: Negative for heartburn, nausea, abdominal pain.   Musculoskeletal: Per HPI.   Skin: Negative for rash.   Neuro: Negative for dizziness, weakness and headaches.   Endo/Heme/Allergies: Does not  "bruise/bleed easily.   Psychiatric/Behavioral: Negative for depression.    Objective   Vitals obtained by patient:  Temperature 36.3 °C (97.3 °F)   Height 1.676 m (5' 6\")   Weight 59.4 kg (131 lb) Comment: Pt stated  Last Menstrual Period 07/05/2021 (Exact Date)   Breastfeeding No   Body Mass Index 21.14 kg/m²   Physical Exam:  Constitutional: Alert, no distress, well-groomed.  Skin: No rash in visible areas.  Eye: Round. Conjunctiva clear, lids normal.  ENMT: Lips pink without lesions, good dentition. Phonation normal.  Neck: No visible masses or thyromegaly. Moves freely without pain.  CV: no peripheral cyanosis, tachycardia.  Respiratory: Unlabored respiratory effort, no cough or audible wheezing.  Psych: Alert and oriented x3, normal affect and mood.     Labs     Pending Urine pregnancy test    Imaging      None    Assessment and Plan     Priscilla Salvador is a 36 y.o. female    1. Family planning  - HCG QUALITATIVE UR; Future  - norethindrone-ethinyl estradiol-iron (JUNEL FE 1.5/30) 1.5-30 MG-MCG tablet; Take 1 Tablet by mouth every day.  Dispense: 84 Tablet; Refill: 3  2. Oral contraceptive use  Re-start OCP after NEGATIVE pregnancy test  - HCG QUALITATIVE UR; Future  - norethindrone-ethinyl estradiol-iron (JUNEL FE 1.5/30) 1.5-30 MG-MCG tablet; Take 1 Tablet by mouth every day.  Dispense: 84 Tablet; Refill: 3    3. Recurrent knee pain, unspecified laterality  Stable, continue activity as tolerated    4. Health care maintenance  5. Immunization counseling  Advised Covid vaccine    Follow-up: In 1 year and as needed            "